# Patient Record
Sex: FEMALE | Race: OTHER | Employment: FULL TIME | URBAN - METROPOLITAN AREA
[De-identification: names, ages, dates, MRNs, and addresses within clinical notes are randomized per-mention and may not be internally consistent; named-entity substitution may affect disease eponyms.]

---

## 2020-01-26 ENCOUNTER — OFFICE VISIT (OUTPATIENT)
Dept: FAMILY MEDICINE CLINIC | Facility: CLINIC | Age: 20
End: 2020-01-26

## 2020-01-26 VITALS
TEMPERATURE: 98 F | RESPIRATION RATE: 16 BRPM | DIASTOLIC BLOOD PRESSURE: 73 MMHG | HEIGHT: 70 IN | BODY MASS INDEX: 27.77 KG/M2 | OXYGEN SATURATION: 98 % | HEART RATE: 68 BPM | WEIGHT: 194 LBS | SYSTOLIC BLOOD PRESSURE: 120 MMHG

## 2020-01-26 DIAGNOSIS — Z02.1 PHYSICAL EXAM, PRE-EMPLOYMENT: Primary | ICD-10-CM

## 2020-01-26 DIAGNOSIS — Z11.1 SCREENING FOR TUBERCULOSIS: ICD-10-CM

## 2020-01-26 DIAGNOSIS — Z23 NEED FOR VACCINATION: ICD-10-CM

## 2020-01-26 PROCEDURE — 86580 TB INTRADERMAL TEST: CPT | Performed by: PHYSICIAN ASSISTANT

## 2020-01-26 PROCEDURE — 90471 IMMUNIZATION ADMIN: CPT | Performed by: PHYSICIAN ASSISTANT

## 2020-01-26 PROCEDURE — 99385 PREV VISIT NEW AGE 18-39: CPT | Performed by: PHYSICIAN ASSISTANT

## 2020-01-26 PROCEDURE — 90715 TDAP VACCINE 7 YRS/> IM: CPT | Performed by: PHYSICIAN ASSISTANT

## 2020-01-26 NOTE — PATIENT INSTRUCTIONS
You will need to return to clinic in 48-72 hours to have results of TB test read. Please return to clinic on 1/28/2020 after 1013am or on 1/29/2020 before 1013am to have your TB test read.     If you do not return during this time your test will need Chlamydia Sexually active women ages 22 and younger, and women at increased risk for infection (such as having multiple sex partners) Every year if you're at risk or have symptoms   Depression All women in this age group At routine exams   Type 2 diabetes, Hepatitis B Women at increased risk for infection should talk with their healthcare provider 3 doses over 6 months; second dose should be given 1 month after the first dose; the third dose should be given at least 2 months after the second dose and at leas Sexually transmitted infection prevention Women who are sexually active At routine exams   Skin cancer Prevention of skin cancer in fair-skinned adults At routine exams   Use of tobacco and the health effects it can cause All women in this age group Every

## 2020-01-26 NOTE — PROGRESS NOTES
CHIEF COMPLAINT:     Patient presents with:  Employment Physical:  worker, no health concerns at this time      HPI:   Jose Milligan is a 20year old female who presents for physical exam for pre-employment physicial.  Has been wor Drug use: Yes      Types: Cannabis      Comment: 3 times a week- states no job impairement issues     Occ:  worker. Exercise: gym 3 times a week  Diet:low fat and sugers     REVIEW OF SYSTEMS:   GENERAL: Feels well, denies night sweats or fevers. no cyanosis, clubbing or edema  NEURO: alert & oriented x 3, cranial nerves 2-12 grossly intact, brachioradialis and patella reflex's 2/4 bilaterally    ASSESSMENT:     Joel Pimentel is a 20year old female who presents for a work physical.

## 2020-02-11 ENCOUNTER — OFFICE VISIT (OUTPATIENT)
Dept: FAMILY MEDICINE CLINIC | Facility: CLINIC | Age: 20
End: 2020-02-11

## 2020-02-11 DIAGNOSIS — Z11.1 SCREENING EXAMINATION FOR PULMONARY TUBERCULOSIS: Primary | ICD-10-CM

## 2020-02-11 NOTE — PATIENT INSTRUCTIONS
You will need to return to clinic in 48-72 hours to have results of TB test read. Please return to clinic on   Thursday February 13, 2020 AFTER 8:30 am     or on   Friday February 14, 2020  before 8:30 am   to have your TB test read.     If you do not r

## 2020-02-11 NOTE — PROGRESS NOTES
Patient is here today for TB skin test.      TB skin test questionnaire was completed and reviewed by me. Patient denies live vaccines in the past month.    Patient denies steroid medication in the past month  Patient denies Hx of BCG vaccine  Pt denies

## 2020-02-14 ENCOUNTER — OFFICE VISIT (OUTPATIENT)
Dept: FAMILY MEDICINE CLINIC | Facility: CLINIC | Age: 20
End: 2020-02-14

## 2020-02-14 DIAGNOSIS — Z11.1 ENCOUNTER FOR PPD SKIN TEST READING: Primary | ICD-10-CM

## 2020-02-14 LAB — INDURATION (): 0 MM (ref 0–11)

## 2020-02-14 NOTE — PROGRESS NOTES
Patient presents for PPD read. Did not return for initial PPD reading placed on 1/26/2020, got repeat testing on 2/11/2020 at 830am    Test placed on right forearm. Results: 0 mm induration. Letter of results printed and given patient.     No further

## 2020-03-09 ENCOUNTER — OFFICE VISIT (OUTPATIENT)
Dept: FAMILY MEDICINE CLINIC | Facility: CLINIC | Age: 20
End: 2020-03-09

## 2020-03-09 VITALS
OXYGEN SATURATION: 98 % | BODY MASS INDEX: 26.2 KG/M2 | HEART RATE: 84 BPM | WEIGHT: 183 LBS | DIASTOLIC BLOOD PRESSURE: 76 MMHG | TEMPERATURE: 98 F | SYSTOLIC BLOOD PRESSURE: 112 MMHG | RESPIRATION RATE: 18 BRPM | HEIGHT: 70 IN

## 2020-03-09 DIAGNOSIS — B08.4 HAND, FOOT AND MOUTH DISEASE (HFMD): Primary | ICD-10-CM

## 2020-03-09 PROCEDURE — 99213 OFFICE O/P EST LOW 20 MIN: CPT | Performed by: NURSE PRACTITIONER

## 2020-03-09 NOTE — PATIENT INSTRUCTIONS
Hand, Foot, and Mouth Disease (Child)    Hand, foot, and mouth disease (HFMD) is an illness caused by a virus. It is usually seen in young children.  This virus causes small ulcers in the mouth (throat, lips, cheeks, gums, and tongue) and small blisters o · Acetaminophen or ibuprofen may be used for pain or discomfort or fever.  Please consult your child's healthcare provider before giving your child acetaminophen or ibuprofen for dosing instructions and when to give the medicine (schedule).  Do not give ibu · Your child's symptoms are getting worse  · Your child appears to be dehydrated (dry mouth, no tears, haven' t urinated is 8 or more hours)  · Your child has a fever (see Fever and children, below)  Call 911  Call 911 if any of these occur:  · Unusual fus © 6916-4801 [employer] [address] [facility] [place], [facility] [place]. All rights reserved. This information is not intended as a substitute for professional medical care. Always follow your healthcare professional's instructions.

## 2020-03-10 NOTE — PROGRESS NOTES
CHIEF COMPLAINT:   Patient presents with:  Derm Problem: lesions on bilateral hands, feet and mouth, + coxsackie virus      HPI:     Julian Morillo is a 20year old female who is employed at a , presents with concerns of 2 days hx of f EYES: conjunctiva clear, EOM intact  NOSE: Normal external nose. No rhinorrhea. MOUTH: Moist oral mucosa. No lesions.  Throat: 7 Lesions noted on posterior pharynx, erythematous base 2 with white centers  NECK: supple, non-tender  LUNGS: clear to ausculta Hand, foot, and mouth disease (HFMD) is an illness caused by a virus. It is usually seen in young children.  This virus causes small ulcers in the mouth (throat, lips, cheeks, gums, and tongue) and small blisters or red spots may appear on the palms (hands) · Liquid rinses may be used in children over 15months of age. Ask your child's healthcare provider for instructions. Feeding  Follow a soft diet with plenty of fluids to prevent dehydration.  If your child doesn't want to eat solid foods, it's OK for a fe For infants and toddlers, be sure to use a rectal thermometer correctly. A rectal thermometer may accidentally poke a hole in (perforate) the rectum. It may also pass on germs from the stool. Always follow the product maker’s directions for proper use.  If

## (undated) NOTE — LETTER
Date: 3/9/2020    Patient Name: Mel Cabrera          To Whom it may concern: This letter has been written at the patient's request. The above patient was seen at the [facility] for treatment of a medical condition.     This

## (undated) NOTE — LETTER
Date: 3/11/2020    Patient Name: Magda Figueroa          To Whom it may concern: This letter has been written at the patient's request. The above patient was seen at the [facility] for treatment of a medical condition.       Guerline Block

## (undated) NOTE — LETTER
February 14, 2020 [address] [address] [address]      Dear Sukh Huntley: The following are the results of your recent tests. Please review the list of test results.   Your result is the value on the left; we have also